# Patient Record
Sex: FEMALE | Race: WHITE
[De-identification: names, ages, dates, MRNs, and addresses within clinical notes are randomized per-mention and may not be internally consistent; named-entity substitution may affect disease eponyms.]

---
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
## 2018-10-01 ENCOUNTER — HOSPITAL ENCOUNTER (OUTPATIENT)
Dept: HOSPITAL 75 - RAD | Age: 18
End: 2018-10-01
Attending: FAMILY MEDICINE
Payer: SELF-PAY

## 2018-10-01 DIAGNOSIS — M54.2: ICD-10-CM

## 2018-10-01 DIAGNOSIS — G89.29: Primary | ICD-10-CM

## 2018-10-01 PROCEDURE — 72141 MRI NECK SPINE W/O DYE: CPT

## 2018-10-01 NOTE — DIAGNOSTIC IMAGING REPORT
PROCEDURE: MR imaging cervical spine without contrast.



TECHNIQUE: Multiplanar, multisequence MR imaging of the cervical

spine was performed without contrast.



INDICATION: Chronic neck pain. 



COMPARISON: None.



FINDINGS: Normal alignment. Vertebral body heights preserved.

Normal bone marrow signal. Intervertebral disc signal is well

preserved. No substantial spondylotic change. No abnormal signal

in the cervical spinal cord. No spinal canal or neural foraminal

narrowing. The visualized paravertebral soft tissues are

unremarkable.



IMPRESSION: Normal MRI of the cervical spine. No acute osseous

findings. No neural impingement. No abnormal signal in the

cervical spinal cord.



Dictated by: 



  Dictated on workstation # [id number]

## 2021-11-06 ENCOUNTER — HOSPITAL ENCOUNTER (EMERGENCY)
Dept: HOSPITAL 75 - ER | Age: 21
Discharge: HOME | End: 2021-11-06
Payer: SELF-PAY

## 2021-11-06 VITALS — SYSTOLIC BLOOD PRESSURE: 129 MMHG | DIASTOLIC BLOOD PRESSURE: 82 MMHG

## 2021-11-06 VITALS — HEIGHT: 61.97 IN | WEIGHT: 223.55 LBS | BODY MASS INDEX: 41.14 KG/M2

## 2021-11-06 DIAGNOSIS — U07.1: Primary | ICD-10-CM

## 2021-11-06 DIAGNOSIS — E66.9: ICD-10-CM

## 2021-11-06 PROCEDURE — 99283 EMERGENCY DEPT VISIT LOW MDM: CPT

## 2021-11-06 PROCEDURE — 87636 SARSCOV2 & INF A&B AMP PRB: CPT

## 2021-11-06 NOTE — ED COUGH/URI
General


Chief Complaint:  COVID19 Suspect/Confirmed


Stated Complaint:  COUGH/NAUSEA/HEADACHE/DIARRHEA/SORE THROAT/CHILLS


Nursing Triage Note:  


PATIENT STATES THAT SHE BEGAN TO HAVE COVID SYMPTOMS ON MONDAY AND TESTED 


POSITIVE ON 11/05/21.  SHE C/O CHILLS, FEVER, BODY ACHES, SORE THROAT, NAUSEA, 


AND LOSS OF TASTE AND SMELL. SHE HAS BEEN TAKING TYLENOL, MOTRIN AND SINGULAIR.


Source:  patient





History of Present Illness


Date Seen by Provider:  Nov 6, 2021


Time Seen by Provider:  22:10


Initial Comments


PT ARRIVES VIA POV FROM [facility] [place] WITH MALE S.O.--ALSO BEING SEEN FOR SAME


PT BEGAN GETTING SICK ON MONDAY 11/01/21


C/O NON-PRODUCTIVE COUGH


C/O TEMP UP 


C/O BODY ACHES


C/O HEADACHE


C/O NAUSEA, NO VOMITING


C/O DIARRHEA X 5 TODAY


C/O LOSS OF TASTE AND SMELL


C/O SORE THROAT


C/O FATIGUE


NO SHORTNESS OF BREATH


NO CHEST PAIN 





PT IS DRINKING FLUIDS WELL, AND STILL EATING BUT APPETITE HAS BEEN DECREASED


PT IS VOIDING A NORMAL AMOUNT--VOIDED ON ARRIVAL AND 1 1/2 HOURS PRIOR TO 

ARRIVAL





LMP--ENDED 3 DAYS AGO. NORMAL. 





PT AND MALE S.O. BOTH TESTED + FOR COVID-19 YESTERDAY AT [facility]


PT HAS NOT BEEN VACCINATED FOR COVID-19





HAS TAKEN TYLENOL, MOTRIN AND CLARITIN


HAS NOT SOUGHT CARE AT ANY TIME


SYMPTOMS NO DIFFERENT TONIGHT IN ANY WAY


PT HAS NO CHRONIC ILLNESSES





PCP: [facility]





Allergies and Home Medications


Allergies


Coded Allergies:  


     No Known Drug Allergies (Unverified , 5/3/17)





Patient Home Medication List


Home Medication List Reviewed:  Yes


Ondansetron (Ondansetron Odt) 4 Mg Tab.rapdis, 4 MG PO Q4H


   Prescribed by: JOEL ERVIN on 11/6/21 7133





Review of Systems


Review of Systems


Constitutional:  see HPI, chills, fever


EENTM:  see HPI, nose congestion, throat pain


Respiratory:  see HPI, cough; No short of breath


Cardiovascular:  no symptoms reported; No chest pain


Gastrointestinal:  see HPI; No abdominal pain; diarrhea, loss of appetite, 

nausea; No vomiting


Genitourinary:  no symptoms reported


Musculoskeletal:  see HPI (BODY ACHES)


Skin:  no symptoms reported


Psychiatric/Neurological:  See HPI, Headache


Hematologic/Lymphatic:  No Symptoms Reported


Immunological/Allergic:  no symptoms reported





Past Medical-Social-Family Hx


Patient Social History


Tobacco Use?:  No


Substance use?:  No


Alcohol Use?:  No





Past Medical History


Surgeries:  No


Respiratory:  No


Cardiac:  No


Neurological:  No


Reproductive Disorders:  No


Genitourinary:  No


Gastrointestinal:  No


Musculoskeletal:  No


Endocrine:  No (OBESITY)


HEENT:  No


Cancer:  No


Psychosocial:  No


Integumentary:  No


Blood Disorders:  No





Physical Exam





Vital Signs - First Documented








 11/6/21 11/6/21





 22:20 22:30


 


Temp 37.5 


 


Pulse 104 


 


Resp 22 


 


B/P (MAP) 134/89 (104) 


 


Pulse Ox 96 


 


O2 Delivery Room Air 


 


O2 Flow Rate  97.00





Capillary Refill :


Height: '"


Weight: lbs. oz. kg; 40.00 BMI


Method:


General Appearance:  WD/WN, no apparent distress


HEENT:  PERRL/EOMI, normal ENT inspection, TMs normal, pharynx normal


Neck:  non-tender, full range of motion, supple, normal inspection


Respiratory:  normal breath sounds, no respiratory distress, no accessory muscle

use


Cardiovascular:  regular rate, rhythm, no murmur


Gastrointestinal:  non tender, soft


Extremities:  normal inspection


Neurologic/Psychiatric:  CNs II-XII nml as tested, no motor/sensory deficits, 

alert, normal mood/affect, oriented x 3


Skin:  normal color, warm/dry; No rash





Progress/Results/Core Measures


Suspected Sepsis


SIRS


Temperature: 


Pulse: 104 


Respiratory Rate: 22


 


Blood Pressure 134 /89 


Mean: 104





Results/Orders


Lab Results





Laboratory Tests








Test


 11/6/21


22:23 Range/Units


 


 


Influenza Type A (RT-PCR) Not Detected  Not Detecte  


 


Influenza Type B (RT-PCR) Not Detected  Not Detecte  


 


SARS-CoV-2 RNA (RT-PCR) Detected H Not Detecte  








My Orders





Orders - JOEL ERVIN DO


Influenza A And B By Pcr (11/6/21 22:10)


Covid 19 Inhouse Test (11/6/21 22:10)


Rx-Ondansetron Po (Rx-Zofran Po) (11/6/21 23:41)





Vital Signs/I&O











 11/6/21 11/6/21 11/6/21





 22:20 22:30 23:56


 


Temp 37.5  37.2


 


Pulse 104  98


 


Resp 22  20


 


B/P (MAP) 134/89 (104)  129/82


 


Pulse Ox 96  95


 


O2 Delivery Room Air  Room Air


 


O2 Flow Rate  97.00 





Capillary Refill :








Blood Pressure Mean:                    104








Progress Note :  


Progress Note


PLACED IN ISOLATION ROOM


PPE WORN AT ALL TIMES


COVID-19 TESTING PERFORMED





NO SIGNIFICANT COUGH


NO DYSPNEA


NO HYPOXIA





AS THIS IS WEEKEND, AND REGENERON INFUSIONS NOT AVAILABLE ON WEEKEND, PT WILL BE

>10 DAYS FROM ONSET OF ILLNESS BEFORE SHE COULD RECEIVE THE INFUSION





DISCUSSED ANTICIPATED COURSE AND SYMPTOMATIC TREATMENT AT THIS POINT





Departure


Impression





   Primary Impression:  


   COVID-19 virus infection


Disposition:  01 HOME, SELF-CARE


Condition:  Stable





Departure-Patient Inst.


Decision time for Depature:  23:35


Referrals:  


FARRUKH TIJERINA DO (PCP)


Primary Care Physician








PEDRO LUIS SCHMITZ (Family)


Primary Care Physician


Patient Instructions:  COVID-19 ED, Preventing the Spread of an Infectious 

Disease





Add. Discharge Instructions:  


LOTS OF CLEAR LIQUIDS





TYLENOL 1 GRAM/ MOTRIN 800 MG 4 TIMES A DAY FOR PAIN OR FEVER





OVER THE COUNTER ROBITUSSIN DM FOR COUGH AND CONGESTION





FOLLOW UP WITH [facility] IN 4-5 DAYS IF NO BETTER, RETURN TO ER IF WORSE





CONTINUE QUARANTINE FOR AN ADDITIONAL 10 DAYS





All discharge instructions reviewed with patient and/or family. Voiced 

understanding.


Scripts


Ondansetron (Ondansetron Odt) 4 Mg Tab.rapdis


4 MG PO Q4H for Nausea/Vomiting, #10 TAB


   Prov: JOEL ERVIN DO         11/6/21


Work/School Note:  Work Release Form   Date Seen in the Emergency Department:  

Nov 6, 2021


   Return to Work:  Nov 15, 2021











JOEL ERVIN DO                  Nov 6, 2021 23:10